# Patient Record
Sex: FEMALE | Race: WHITE
[De-identification: names, ages, dates, MRNs, and addresses within clinical notes are randomized per-mention and may not be internally consistent; named-entity substitution may affect disease eponyms.]

---

## 2017-04-06 ENCOUNTER — HOSPITAL ENCOUNTER (EMERGENCY)
Dept: HOSPITAL 10 - FTE | Age: 52
Discharge: HOME | End: 2017-04-06
Payer: MEDICAID

## 2017-04-06 VITALS — HEIGHT: 55 IN | WEIGHT: 158.73 LBS | BODY MASS INDEX: 36.73 KG/M2

## 2017-04-06 DIAGNOSIS — T19.2XXA: Primary | ICD-10-CM
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
## 2017-04-06 PROCEDURE — 99284 EMERGENCY DEPT VISIT MOD MDM: CPT

## 2017-04-06 NOTE — ERD
ER Documentation


Chief Complaint


Date/Time


DATE: 4/6/17 


TIME: 20:47


Chief Complaint


TAMPON STUCK





HPI


Patient is a 51-year-old female with no medical problems who presents saying 

that she has a tampon stuck in her vagina.  She said that is been there for 3 

days.  She denies any vaginal bleeding currently.  She said that she feels like 

she can touch her but she could not grab it to pull it out.  She denies any 

fevers.  She has no other complaints.  She does not currently have a primary 

doctor.





ROS


All systems reviewed and are negative except as per history of present illness.





Allergies


Allergies:  


Coded Allergies:  


     No Known Allergy (Unverified , 4/6/17)





PMhx/Soc


Medical and Surgical Hx:  pt denies Medical Hx


History of Surgery:  No


Anesthesia Reaction:  No


Hx Neurological Disorder:  No


Hx Respiratory Disorders:  No


Hx Cardiac Disorders:  No


Hx Psychiatric Problems:  No


Hx Miscellaneous Medical Probl:  No


Hx Alcohol Use:  No


Hx Substance Use:  No


Hx Tobacco Use:  No


Smoking Status:  Never smoker





FmHx


Family History:  No diabetes





Physical Exam


Vitals





Vital Signs








  Date Time  Temp Pulse Resp B/P Pulse Ox O2 Delivery O2 Flow Rate FiO2


 


4/6/17 18:21 98.1 90 18 144/77 99   








Physical Exam


Const: No acute distress


Head:   Atraumatic 


Eyes:    Normal Conjunctiva


ENT:    Normal External Ears, Nose and Mouth.


Neck:               Full range of motion..~ No meningismus.


Resp:    Clear to auscultation bilaterally


Cardio:    Regular rate and rhythm, no murmurs


Abd:    Soft, non tender, non distended. Normal bowel sounds


Skin:    No petechiae or rashes


Back:    No midline or flank tenderness


Ext:    No cyanosis, or edema


Neur:    Awake and alert


:    I performed a speculum exam and was able to visualize the cervix, there 

was what appeared to be organic material such as toilet paper at approximately 

5 PM in relation to the cervix, I was able to remove this, there is no tampon





Procedures/MDM


Patient is a 51-year-old female presents saying that she had a tampon stuck in 

her vagina.  She did not have any tampon stuck and I was able to visualize the 

entire vaginal canal.  I was able to find a small amount of organic material 

which appeared to be decomposing toilet paper below the cervix.  This was 

removed.  The patient tolerated the entire procedure.  I believe outpatient 

management is appropriate.  I doubt STD.  She will need to follow-up closely 

with her gynecologist and she says she does have one





Departure


Diagnosis:  


 Primary Impression:  


 Retained foreign body


Condition:  Fair


Patient Instructions:  Vaginal Foreign Body, Removed (Adult)


Referrals:  


Your gynceologist





Additional Instructions:  


Specialist:Usted tiene katherine condicin mdica que requiere que zander a un 

especialista dentro de los prximos 1-2 yang.POR FAVOR,CON CHAPPELL SEGUIMIENTO DE 

PRIMARIA PHSICIAN refferal. SI USTED NO TIENE UN MDICO GENERAL Y / O USTED NO 

PUEDE PAGAR meg a un mdico,los siguientes eldridge RECURSOS sido suministrado a 

usted. ES CHAPPELL RESPONSABILIDAD PARA SER VISTOS POR EL ESPECIALISTA:











ONEYDA SHEN MD Apr 6, 2017 20:49

## 2018-02-04 ENCOUNTER — HOSPITAL ENCOUNTER (OUTPATIENT)
Age: 53
Setting detail: OBSERVATION
LOS: 2 days | Discharge: HOME | End: 2018-02-06

## 2018-02-04 ENCOUNTER — HOSPITAL ENCOUNTER (OUTPATIENT)
Dept: HOSPITAL 91 - E/R | Age: 53
Setting detail: OBSERVATION
LOS: 2 days | Discharge: HOME | End: 2018-02-06
Payer: MEDICAID

## 2018-02-04 DIAGNOSIS — E11.65: Primary | ICD-10-CM

## 2018-02-04 DIAGNOSIS — R55: ICD-10-CM

## 2018-02-04 LAB
ABNORMAL IP MESSAGE: 1
ADD MAN DIFF?: NO
ALANINE AMINOTRANSFERASE: 39 IU/L (ref 13–69)
ALBUMIN/GLOBULIN RATIO: 1.38
ALBUMIN: 4.7 G/DL (ref 3.3–4.9)
ALKALINE PHOSPHATASE: 94 IU/L (ref 42–121)
ANION GAP: 17 (ref 8–16)
ASPARTATE AMINO TRANSFERASE: 30 IU/L (ref 15–46)
BASOPHIL #: 0 10^3/UL (ref 0–0.1)
BASOPHILS %: 0.2 % (ref 0–2)
BILIRUBIN,DIRECT: 0 MG/DL (ref 0–0.2)
BILIRUBIN,TOTAL: 0.3 MG/DL (ref 0.2–1.3)
BLOOD UREA NITROGEN: 10 MG/DL (ref 7–20)
CALCIUM: 9.3 MG/DL (ref 8.4–10.2)
CARBON DIOXIDE: 22 MMOL/L (ref 21–31)
CHLORIDE: 102 MMOL/L (ref 97–110)
CK INDEX: 0.9
CK-MB: 1 NG/ML (ref 0–2.4)
CREATINE KINASE: 106 IU/L (ref 23–200)
CREATININE: 0.45 MG/DL (ref 0.44–1)
D-DIMER: 220 NG/ML (ref ?–460)
EOSINOPHILS #: 0 10^3/UL (ref 0–0.5)
EOSINOPHILS %: 0 % (ref 0–7)
ETHANOL: < 10 MG/DL
FREE T4 (FREE THYROXINE): 1.23 NG/DL (ref 0.64–1.79)
FREE THYROXINE INDEX (CALC): 3.28 UG/ML (ref 0.65–3.89)
GLOBULIN: 3.4 G/DL (ref 1.3–3.2)
GLUCOSE: 283 MG/DL (ref 70–220)
HEMATOCRIT: 45.3 % (ref 37–47)
HEMOGLOBIN A1C: 9.4 % (ref 0–5.9)
HEMOGLOBIN: 15.8 G/DL (ref 12–16)
INR: 0.83
LYMPHOCYTES #: 5.4 10^3/UL (ref 0.8–2.9)
LYMPHOCYTES %: 49.7 % (ref 15–51)
MEAN CORPUSCULAR HEMOGLOBIN: 30.6 PG (ref 29–33)
MEAN CORPUSCULAR HGB CONC: 34.9 G/DL (ref 32–37)
MEAN CORPUSCULAR VOLUME: 87.6 FL (ref 82–101)
MEAN PLATELET VOLUME: 10.5 FL (ref 7.4–10.4)
MONOCYTE #: 0.7 10^3/UL (ref 0.3–0.9)
MONOCYTES %: 6.5 % (ref 0–11)
NEUTROPHIL #: 4.7 10^3/UL (ref 1.6–7.5)
NEUTROPHILS %: 43.3 % (ref 39–77)
NUCLEATED RED BLOOD CELLS #: 0 10^3/UL (ref 0–0)
NUCLEATED RED BLOOD CELLS%: 0 /100WBC (ref 0–0)
PARTIAL THROMBOPLASTIN TIME: 24.4 SEC (ref 25–35)
PLATELET COUNT: 324 10^3/UL (ref 140–415)
POSITIVE DIFF: (no result)
POTASSIUM: 3.7 MMOL/L (ref 3.5–5.1)
PROTIME: 11.5 SEC (ref 11.9–14.9)
PT RATIO: 0.9
RED BLOOD COUNT: 5.17 10^6/UL (ref 4.2–5.4)
RED CELL DISTRIBUTION WIDTH: 12.5 % (ref 11.5–14.5)
SODIUM: 137 MMOL/L (ref 135–144)
T3 UPTAKE: 33.1 % (ref 23.5–40.5)
T4 (THYROXINE): 9.9 UG/DL (ref 5.5–11)
TOTAL PROTEIN: 8.1 G/DL (ref 6.1–8.1)
TROPONIN-I: < 0.012 NG/ML (ref 0–0.12)
WHITE BLOOD COUNT: 10.9 10^3/UL (ref 4.8–10.8)

## 2018-02-04 PROCEDURE — 85025 COMPLETE CBC W/AUTO DIFF WBC: CPT

## 2018-02-04 PROCEDURE — 82550 ASSAY OF CK (CPK): CPT

## 2018-02-04 PROCEDURE — 85610 PROTHROMBIN TIME: CPT

## 2018-02-04 PROCEDURE — 85378 FIBRIN DEGRADE SEMIQUANT: CPT

## 2018-02-04 PROCEDURE — 80048 BASIC METABOLIC PNL TOTAL CA: CPT

## 2018-02-04 PROCEDURE — 70551 MRI BRAIN STEM W/O DYE: CPT

## 2018-02-04 PROCEDURE — 92610 EVALUATE SWALLOWING FUNCTION: CPT

## 2018-02-04 PROCEDURE — 97162 PT EVAL MOD COMPLEX 30 MIN: CPT

## 2018-02-04 PROCEDURE — 99285 EMERGENCY DEPT VISIT HI MDM: CPT

## 2018-02-04 PROCEDURE — 71045 X-RAY EXAM CHEST 1 VIEW: CPT

## 2018-02-04 PROCEDURE — 84100 ASSAY OF PHOSPHORUS: CPT

## 2018-02-04 PROCEDURE — 84484 ASSAY OF TROPONIN QUANT: CPT

## 2018-02-04 PROCEDURE — 80061 LIPID PANEL: CPT

## 2018-02-04 PROCEDURE — 93880 EXTRACRANIAL BILAT STUDY: CPT

## 2018-02-04 PROCEDURE — 93005 ELECTROCARDIOGRAM TRACING: CPT

## 2018-02-04 PROCEDURE — 80053 COMPREHEN METABOLIC PANEL: CPT

## 2018-02-04 PROCEDURE — 70450 CT HEAD/BRAIN W/O DYE: CPT

## 2018-02-04 PROCEDURE — 83735 ASSAY OF MAGNESIUM: CPT

## 2018-02-04 PROCEDURE — 96374 THER/PROPH/DIAG INJ IV PUSH: CPT

## 2018-02-04 PROCEDURE — 82962 GLUCOSE BLOOD TEST: CPT

## 2018-02-04 PROCEDURE — 82533 TOTAL CORTISOL: CPT

## 2018-02-04 PROCEDURE — 82553 CREATINE MB FRACTION: CPT

## 2018-02-04 PROCEDURE — 85730 THROMBOPLASTIN TIME PARTIAL: CPT

## 2018-02-04 PROCEDURE — 93306 TTE W/DOPPLER COMPLETE: CPT

## 2018-02-04 PROCEDURE — 84479 ASSAY OF THYROID (T3 OR T4): CPT

## 2018-02-04 PROCEDURE — 84436 ASSAY OF TOTAL THYROXINE: CPT

## 2018-02-04 PROCEDURE — 80306 DRUG TEST PRSMV INSTRMNT: CPT

## 2018-02-04 PROCEDURE — 83036 HEMOGLOBIN GLYCOSYLATED A1C: CPT

## 2018-02-04 PROCEDURE — 84703 CHORIONIC GONADOTROPIN ASSAY: CPT

## 2018-02-04 PROCEDURE — 84439 ASSAY OF FREE THYROXINE: CPT

## 2018-02-04 PROCEDURE — 84443 ASSAY THYROID STIM HORMONE: CPT

## 2018-02-04 PROCEDURE — 97166 OT EVAL MOD COMPLEX 45 MIN: CPT

## 2018-02-04 RX ADMIN — LORAZEPAM 1 MG: 2 INJECTION, SOLUTION INTRAMUSCULAR; INTRAVENOUS at 11:45

## 2018-02-04 RX ADMIN — ACETAMINOPHEN 1 MG: 325 TABLET, FILM COATED ORAL at 20:59

## 2018-02-04 RX ADMIN — INSULIN ASPART 1 UNIT: 100 INJECTION, SOLUTION INTRAVENOUS; SUBCUTANEOUS at 21:20

## 2018-02-04 RX ADMIN — THIAMINE HYDROCHLORIDE 1 MLS/HR: 100 INJECTION, SOLUTION INTRAMUSCULAR; INTRAVENOUS at 11:44

## 2018-02-04 RX ADMIN — ASPIRIN 1 MG: 81 TABLET, COATED ORAL at 17:16

## 2018-02-04 RX ADMIN — POTASSIUM ACETATE 1 MLS/HR: 3.93 INJECTION, SOLUTION, CONCENTRATE INTRAVENOUS at 17:15

## 2018-02-04 RX ADMIN — THIAMINE HYDROCHLORIDE 1 MLS/HR: 100 INJECTION, SOLUTION INTRAMUSCULAR; INTRAVENOUS at 10:08

## 2018-02-04 RX ADMIN — HEPARIN SODIUM 1 UNIT: 5000 INJECTION, SOLUTION INTRAVENOUS; SUBCUTANEOUS at 20:34

## 2018-02-04 RX ADMIN — LORAZEPAM 1 MG: 2 INJECTION, SOLUTION INTRAMUSCULAR; INTRAVENOUS at 10:09

## 2018-02-04 RX ADMIN — INSULIN ASPART 1 UNIT: 100 INJECTION, SOLUTION INTRAVENOUS; SUBCUTANEOUS at 18:08

## 2018-02-04 RX ADMIN — INSULIN LISPRO 1 UNIT: 100 INJECTION, SOLUTION INTRAVENOUS; SUBCUTANEOUS at 13:05

## 2018-02-05 LAB
ADD MAN DIFF?: YES
ANION GAP: 12 (ref 8–16)
ANISOCYTOSIS: (no result) (ref 0–0)
BAND NEUTROPHILS #M: 0.4 10^3/UL (ref 0–0.6)
BAND NEUTROPHILS % (M): 6 % (ref 0–4)
BLOOD UREA NITROGEN: 7 MG/DL (ref 7–20)
CALCIUM: 8.7 MG/DL (ref 8.4–10.2)
CARBON DIOXIDE: 25 MMOL/L (ref 21–31)
CHLORIDE: 103 MMOL/L (ref 97–110)
CHOL/HDL RATIO: 5.3 RATIO
CHOLESTEROL: 212 MG/DL (ref 100–200)
CREATININE: 0.41 MG/DL (ref 0.44–1)
GIANT THROMBO% (M): 2 % (ref 0–0)
GLUCOSE: 184 MG/DL (ref 70–220)
HDL CHOLESTEROL: 40 MG/DL (ref 37–92)
HEMATOCRIT: 40.5 % (ref 37–47)
HEMOGLOBIN A1C: 9.5 % (ref 0–5.9)
HEMOGLOBIN: 13.8 G/DL (ref 12–16)
LDL CHOLESTEROL,CALCULATED: 105 MG/DL
LYMPHOCYTES #M: 1.8 10^3/UL (ref 0.8–2.9)
LYMPHOCYTES % (M): 26 % (ref 15–51)
MAGNESIUM: 1.7 MG/DL (ref 1.7–2.5)
MEAN CORPUSCULAR HEMOGLOBIN: 30.6 PG (ref 29–33)
MEAN CORPUSCULAR HGB CONC: 34.1 G/DL (ref 32–37)
MEAN CORPUSCULAR VOLUME: 89.8 FL (ref 82–101)
MEAN PLATELET VOLUME: 10.6 FL (ref 7.4–10.4)
MICROCYTOSIS: (no result) (ref 0–0)
MONOCYTE #M: 0.2 10^3/UL (ref 0.3–0.9)
MONOCYTES % (M): 3 % (ref 0–11)
NUCLEATED RED BLOOD CELLS%: 0 /100WBC (ref 0–0)
PHOSPHORUS: 3.3 MG/DL (ref 2.5–4.9)
PLATELET COUNT: 279 10^3/UL (ref 140–415)
PLATELET ESTIMATE: NORMAL
POLYCHROMASIA: (no result) (ref 0–0)
POSITIVE DIFF: (no result)
POTASSIUM: 3.2 MMOL/L (ref 3.5–5.1)
REACTIVE LYMPHOCYTES #M: 0.8 10^3/UL (ref 0–0)
REACTIVE LYMPHOCYTES% (M): 12 % (ref 0–0)
RED BLOOD COUNT: 4.51 10^6/UL (ref 4.2–5.4)
RED CELL DISTRIBUTION WIDTH: 13 % (ref 11.5–14.5)
SEG NEUT #M: 3.8 10^3/UL (ref 1.6–7.5)
SEGMENTED NEUTROPHILS (M) %: 53 % (ref 39–77)
SMUDGE%M: 10 % (ref 0–0)
SODIUM: 137 MMOL/L (ref 135–144)
THYROID STIMULATING HORMONE: 2.76 MIU/L (ref 0.47–4.68)
TRIGLYCERIDES: 334 MG/DL (ref 0–149)
WHITE BLOOD COUNT: 7.2 10^3/UL (ref 4.8–10.8)

## 2018-02-05 RX ADMIN — INSULIN ASPART 1 UNIT: 100 INJECTION, SOLUTION INTRAVENOUS; SUBCUTANEOUS at 04:58

## 2018-02-05 RX ADMIN — INSULIN ASPART 1 UNIT: 100 INJECTION, SOLUTION INTRAVENOUS; SUBCUTANEOUS at 09:34

## 2018-02-05 RX ADMIN — HYDROCODONE BITARTRATE AND ACETAMINOPHEN 1 TAB: 5; 325 TABLET ORAL at 09:11

## 2018-02-05 RX ADMIN — POTASSIUM ACETATE 1 MLS/HR: 3.93 INJECTION, SOLUTION, CONCENTRATE INTRAVENOUS at 02:30

## 2018-02-05 RX ADMIN — INSULIN ASPART 1 UNIT: 100 INJECTION, SOLUTION INTRAVENOUS; SUBCUTANEOUS at 13:05

## 2018-02-05 RX ADMIN — INSULIN ASPART 1 UNIT: 100 INJECTION, SOLUTION INTRAVENOUS; SUBCUTANEOUS at 01:00

## 2018-02-05 RX ADMIN — MAGNESIUM SULFATE HEPTAHYDRATE 1 MLS/HR: 40 INJECTION, SOLUTION INTRAVENOUS at 19:42

## 2018-02-05 RX ADMIN — INSULIN ASPART 1 UNIT: 100 INJECTION, SOLUTION INTRAVENOUS; SUBCUTANEOUS at 18:42

## 2018-02-05 RX ADMIN — ASPIRIN 1 MG: 81 TABLET, COATED ORAL at 09:26

## 2018-02-05 RX ADMIN — POTASSIUM CHLORIDE 1 MEQ: 1500 TABLET, EXTENDED RELEASE ORAL at 19:43

## 2018-02-05 RX ADMIN — DOCUSATE SODIUM 1 MG: 100 CAPSULE, LIQUID FILLED ORAL at 09:26

## 2018-02-05 RX ADMIN — POTASSIUM ACETATE 1 MLS/HR: 3.93 INJECTION, SOLUTION, CONCENTRATE INTRAVENOUS at 19:45

## 2018-02-05 RX ADMIN — HEPARIN SODIUM 1 UNIT: 5000 INJECTION, SOLUTION INTRAVENOUS; SUBCUTANEOUS at 09:33

## 2018-02-05 RX ADMIN — INSULIN ASPART 1 UNIT: 100 INJECTION, SOLUTION INTRAVENOUS; SUBCUTANEOUS at 21:47

## 2018-02-05 RX ADMIN — LORATADINE 1 MG: 10 TABLET ORAL at 13:41

## 2018-02-05 RX ADMIN — HEPARIN SODIUM 1 UNIT: 5000 INJECTION, SOLUTION INTRAVENOUS; SUBCUTANEOUS at 21:46

## 2018-02-06 LAB
ADD MAN DIFF?: NO
ANION GAP: 14 (ref 8–16)
BASOPHIL #: 0 10^3/UL (ref 0–0.1)
BASOPHILS %: 0.3 % (ref 0–2)
BLOOD UREA NITROGEN: 7 MG/DL (ref 7–20)
CALCIUM: 8.6 MG/DL (ref 8.4–10.2)
CARBON DIOXIDE: 23 MMOL/L (ref 21–31)
CHLORIDE: 104 MMOL/L (ref 97–110)
CREATININE: 0.53 MG/DL (ref 0.44–1)
EOSINOPHILS #: 0 10^3/UL (ref 0–0.5)
EOSINOPHILS %: 0 % (ref 0–7)
GLUCOSE: 180 MG/DL (ref 70–220)
HEMATOCRIT: 41.9 % (ref 37–47)
HEMOGLOBIN: 14 G/DL (ref 12–16)
LYMPHOCYTES #: 4 10^3/UL (ref 0.8–2.9)
LYMPHOCYTES %: 52.7 % (ref 15–51)
MEAN CORPUSCULAR HEMOGLOBIN: 30.2 PG (ref 29–33)
MEAN CORPUSCULAR HGB CONC: 33.4 G/DL (ref 32–37)
MEAN CORPUSCULAR VOLUME: 90.3 FL (ref 82–101)
MEAN PLATELET VOLUME: 10.5 FL (ref 7.4–10.4)
MONOCYTE #: 0.6 10^3/UL (ref 0.3–0.9)
MONOCYTES %: 8.3 % (ref 0–11)
NEUTROPHIL #: 2.9 10^3/UL (ref 1.6–7.5)
NEUTROPHILS %: 38.3 % (ref 39–77)
NUCLEATED RED BLOOD CELLS #: 0 10^3/UL (ref 0–0)
NUCLEATED RED BLOOD CELLS%: 0 /100WBC (ref 0–0)
PLATELET COUNT: 304 10^3/UL (ref 140–415)
POTASSIUM: 3.8 MMOL/L (ref 3.5–5.1)
RED BLOOD COUNT: 4.64 10^6/UL (ref 4.2–5.4)
RED CELL DISTRIBUTION WIDTH: 13 % (ref 11.5–14.5)
SODIUM: 137 MMOL/L (ref 135–144)
WHITE BLOOD COUNT: 7.6 10^3/UL (ref 4.8–10.8)

## 2018-02-06 RX ADMIN — HEPARIN SODIUM 1 UNIT: 5000 INJECTION, SOLUTION INTRAVENOUS; SUBCUTANEOUS at 09:33

## 2018-02-06 RX ADMIN — INSULIN ASPART 1 UNIT: 100 INJECTION, SOLUTION INTRAVENOUS; SUBCUTANEOUS at 11:45

## 2018-02-06 RX ADMIN — LORATADINE 1 MG: 10 TABLET ORAL at 11:25

## 2018-02-06 RX ADMIN — ASPIRIN 81 MG CHEWABLE TABLET 1 MG: 81 TABLET CHEWABLE at 11:26

## 2018-02-06 RX ADMIN — INSULIN ASPART 1 UNIT: 100 INJECTION, SOLUTION INTRAVENOUS; SUBCUTANEOUS at 08:24

## 2018-02-06 RX ADMIN — ACETAMINOPHEN 1 MG: 325 TABLET, FILM COATED ORAL at 11:24

## 2018-02-06 RX ADMIN — POTASSIUM ACETATE 1 MLS/HR: 3.93 INJECTION, SOLUTION, CONCENTRATE INTRAVENOUS at 05:20
